# Patient Record
Sex: MALE | Race: WHITE | NOT HISPANIC OR LATINO | URBAN - METROPOLITAN AREA
[De-identification: names, ages, dates, MRNs, and addresses within clinical notes are randomized per-mention and may not be internally consistent; named-entity substitution may affect disease eponyms.]

---

## 2023-05-29 ENCOUNTER — EMERGENCY (EMERGENCY)
Facility: HOSPITAL | Age: 78
LOS: 1 days | Discharge: HOME | End: 2023-05-29
Attending: EMERGENCY MEDICINE
Payer: MEDICARE

## 2023-05-29 VITALS
TEMPERATURE: 98 F | HEIGHT: 66 IN | SYSTOLIC BLOOD PRESSURE: 146 MMHG | DIASTOLIC BLOOD PRESSURE: 63 MMHG | WEIGHT: 167.99 LBS | HEART RATE: 67 BPM | OXYGEN SATURATION: 99 % | RESPIRATION RATE: 16 BRPM

## 2023-05-29 VITALS
OXYGEN SATURATION: 98 % | SYSTOLIC BLOOD PRESSURE: 178 MMHG | DIASTOLIC BLOOD PRESSURE: 73 MMHG | RESPIRATION RATE: 18 BRPM | HEART RATE: 65 BPM | TEMPERATURE: 98 F

## 2023-05-29 PROCEDURE — 90715 TDAP VACCINE 7 YRS/> IM: CPT

## 2023-05-29 PROCEDURE — 99284 EMERGENCY DEPT VISIT MOD MDM: CPT | Mod: 25

## 2023-05-29 PROCEDURE — 70450 CT HEAD/BRAIN W/O DYE: CPT | Mod: 26,MA

## 2023-05-29 PROCEDURE — 82962 GLUCOSE BLOOD TEST: CPT

## 2023-05-29 PROCEDURE — 72125 CT NECK SPINE W/O DYE: CPT | Mod: MA

## 2023-05-29 PROCEDURE — 70450 CT HEAD/BRAIN W/O DYE: CPT | Mod: MA

## 2023-05-29 PROCEDURE — 72125 CT NECK SPINE W/O DYE: CPT | Mod: 26,MA

## 2023-05-29 PROCEDURE — 12011 RPR F/E/E/N/L/M 2.5 CM/<: CPT

## 2023-05-29 PROCEDURE — 90471 IMMUNIZATION ADMIN: CPT

## 2023-05-29 RX ORDER — TETANUS TOXOID, REDUCED DIPHTHERIA TOXOID AND ACELLULAR PERTUSSIS VACCINE, ADSORBED 5; 2.5; 8; 8; 2.5 [IU]/.5ML; [IU]/.5ML; UG/.5ML; UG/.5ML; UG/.5ML
0.5 SUSPENSION INTRAMUSCULAR ONCE
Refills: 0 | Status: COMPLETED | OUTPATIENT
Start: 2023-05-29 | End: 2023-05-29

## 2023-05-29 RX ADMIN — TETANUS TOXOID, REDUCED DIPHTHERIA TOXOID AND ACELLULAR PERTUSSIS VACCINE, ADSORBED 0.5 MILLILITER(S): 5; 2.5; 8; 8; 2.5 SUSPENSION INTRAMUSCULAR at 12:21

## 2023-05-29 NOTE — ED PROVIDER NOTE - OBJECTIVE STATEMENT
76 y/o M hx of peripheral vascular disease presents w/ fall. states he tripped on curb going to a parade today and landed on the concrete. endorsing hitting head, L knee on ground. denies loc, denies blood thinner use. denies any acute pain currently. denies chest pain/sob. denies lightheadedness prior to fall. denies abdominal pain. denies dizziness/lightheadneess now. 76 y/o M hx of peripheral vascular disease presents w/ fall. states he tripped on curb going to a parade today and landed on the concrete. endorsing hitting head, L knee on ground. denies loc, denies blood thinner use. denies any acute pain currently. denies chest pain/sob. denies lightheadedness prior to fall. denies abdominal pain. denies dizziness/lightheadneess now. unknown tetanus status.

## 2023-05-29 NOTE — ED PROVIDER NOTE - NSFOLLOWUPINSTRUCTIONS_ED_ALL_ED_FT
1. Return to ED for worsening, progressive or any other concerning symptoms   2. Follow up with your primary care doctor in 2-3days   3. Take motrin 400mg every 6 hours as needed for pain and Take Tylenol up to 1000 mg every 6 hours as needed for pain.   4. Rest, apply ice over covered skin for no more than 15 minutes at a time, keep affected extremity elevated.   5. Keep the area clean and dry for 24hours, then treat the area as regular skin. Apply a small amount of bacitracin to the area 1-2times per day. Keep out of the sun to prevent scarring     Contusion    A contusion is a deep bruise. Contusions are the result of a blunt injury to tissues and muscle fibers under the skin. The skin overlying the contusion may turn blue, purple, or yellow. Symptoms also include pain and swelling in the injured area.    SEEK IMMEDIATE MEDICAL CARE IF YOU HAVE ANY OF THE FOLLOWING SYMPTOMS: severe pain, numbness, tingling, pain, weakness, or skin color/temperature change in any part of your body distal to the injury.

## 2023-05-29 NOTE — ED PROVIDER NOTE - PHYSICAL EXAMINATION
General: Well appearing female in no acute distress  HEENT: Normocephalic, atraumatic. Moist mucous membranes. Oropharynx clear. No lymphadenopathy.  Eyes: No scleral icterus. EOMI. JONATHAN.  Neck:. Soft and supple. Full ROM without pain. No midline tenderness  Cardiac: Regular rate and regular rhythm. No murmurs, rubs, gallops. Peripheral pulses 2+ and symmetric. No LE edema.  Resp: Lungs CTAB. Speaking in full sentences. No wheezes, rales or rhonchi.  Abd: Soft, non-tender, non-distended. No guarding or rebound. No scars, masses, or lesions.  Back: Spine midline and non-tender. No CVA tenderness.    Skin: +1 cm superficial laceration over the L eyebrow, abrasion/superficial skin avulsion over palmar aspect of 4th digit of the R hand, linear vertical abrasion over the proximal tib/fib region.   Neuro: AO x 3. Moves all extremities symmetrically. Motor strength and sensation grossly intact. General: Well appearing female in no acute distress  HEENT: Normocephalic, atraumatic. Moist mucous membranes. Oropharynx clear. No lymphadenopathy.  Eyes: No scleral icterus. EOMI. JONATHAN.  Neck:. Soft and supple. Full ROM without pain. No midline tenderness  Cardiac: Regular rate and regular rhythm. No murmurs, rubs, gallops. Peripheral pulses 2+ and symmetric. No LE edema.  Resp: Lungs CTAB. Speaking in full sentences. No wheezes, rales or rhonchi.  Abd: Soft, non-tender, non-distended. No guarding or rebound. No scars, masses, or lesions.  Back: Spine midline and non-tender. No CVA tenderness.    Skin: +1 cm superficial laceration over the L eyebrow, abrasion/superficial skin avulsion over palmar aspect of 4th digit of the R hand, linear vertical abrasion over the proximal tib/fib region. abrasion over nasal bridge  Neuro: AO x 3. Moves all extremities symmetrically. Motor strength and sensation grossly intact. General: Well appearing male in no acute distress  HEENT: Normocephalic, Moist mucous membranes. Oropharynx clear. No lymphadenopathy.  Eyes: No scleral icterus. EOMI. JONATHAN.  Neck:. Soft and supple. Full ROM without pain. No midline tenderness  Cardiac: Regular rate and regular rhythm. No murmurs, rubs, gallops. Peripheral pulses 2+ and symmetric. No LE edema.  Resp: Lungs CTAB. Speaking in full sentences. No wheezes, rales or rhonchi.  Abd: Soft, non-tender, non-distended. No guarding or rebound. No scars, masses, or lesions.  Back: Spine midline and non-tender. No CVA tenderness.    Skin: +1 cm superficial laceration over the L eyebrow, abrasion/superficial skin avulsion over palmar aspect of 4th digit of the R hand, linear vertical abrasion over the proximal tib/fib region. abrasion over nasal bridge  Neuro: AO x 3. Moves all extremities symmetrically. Motor strength and sensation grossly intact.

## 2023-05-29 NOTE — ED PROVIDER NOTE - CLINICAL SUMMARY MEDICAL DECISION MAKING FREE TEXT BOX
76 y/o M hx of peripheral vascular disease presents w/ fall. states he tripped on curb going to a parade today and landed on the concrete.  +1 cm superficial laceration over the L eyebrow, abrasion/superficial skin avulsion over palmar aspect of 4th digit of the R hand, linear vertical abrasion over the proximal tib/fib region. abrasion over nasal bridge.   denies loc, denies blood thinner use.   ct head/c-spine, adacel. patient declining pain meds currently. mechanical fall. wounds dressed w/ bacitracin,. 1cm lac above L eyebrow approximated w/ dermabond, bleeding stopped. full ROM of all extremities.

## 2023-05-29 NOTE — ED ADULT TRIAGE NOTE - CHIEF COMPLAINT QUOTE
pt states he was rushing to get to the parade, fell down and hit his head on the concrete.  denies blood thinners or LOC.  pt also notes he felt lightheaded this morning while at the gym.  denies chest pain.  laceration to left eyebrow noted.

## 2023-05-29 NOTE — ED PROVIDER NOTE - PATIENT PORTAL LINK FT
You can access the FollowMyHealth Patient Portal offered by [facility] by registering at the following website: [email or web address]. By joining [facility]’s FollowMyHealth portal, you will also be able to view your health information using other applications (apps) compatible with our system. 1 pair

## 2023-05-29 NOTE — ED ADULT NURSE NOTE - OBJECTIVE STATEMENT
Pt c/o laceration to left eyebrow s/p mechanical trip and fall, hit concrete, denies LOC, AOx3, bleeding controlled, resp even and unlabored, denies nausea

## 2023-05-29 NOTE — ED PROVIDER NOTE - ATTENDING CONTRIBUTION TO CARE
I, Kerline Lara, have personally seen and examined this patient. I have fully participated in the care of this patient. I have reviewed all pertinent clinical information, including history, physical exam, plan and the Resident's note and agree except as noted below.     Pt with episode of trip and fall onto cement, abrasions to b/l hands and left shin, small laceration above left eyebrow repaired with dermabond. FROM all extremities. RRR. CTAB. ct head/neck negative for acute pathology. given Tdap. TBDC